# Patient Record
Sex: MALE | Race: WHITE | Employment: UNEMPLOYED | URBAN - METROPOLITAN AREA
[De-identification: names, ages, dates, MRNs, and addresses within clinical notes are randomized per-mention and may not be internally consistent; named-entity substitution may affect disease eponyms.]

---

## 2020-06-02 ENCOUNTER — HOSPITAL ENCOUNTER (EMERGENCY)
Age: 25
Discharge: HOME | End: 2020-06-02
Attending: EMERGENCY MEDICINE
Payer: COMMERCIAL

## 2020-06-02 ENCOUNTER — APPOINTMENT (OUTPATIENT)
Dept: GENERAL RADIOLOGY | Age: 25
End: 2020-06-02
Payer: COMMERCIAL

## 2020-06-02 VITALS
OXYGEN SATURATION: 97 % | WEIGHT: 152.2 LBS | HEART RATE: 82 BPM | BODY MASS INDEX: 23.07 KG/M2 | SYSTOLIC BLOOD PRESSURE: 140 MMHG | HEIGHT: 68 IN | RESPIRATION RATE: 16 BRPM | TEMPERATURE: 97.9 F | DIASTOLIC BLOOD PRESSURE: 74 MMHG

## 2020-06-02 PROCEDURE — 11740 EVACUATION SUBUNGUAL HMTMA: CPT

## 2020-06-02 PROCEDURE — 73140 X-RAY EXAM OF FINGER(S): CPT

## 2020-06-02 PROCEDURE — 99283 EMERGENCY DEPT VISIT LOW MDM: CPT

## 2020-06-02 RX ORDER — FLUOXETINE HYDROCHLORIDE 20 MG/1
20 CAPSULE ORAL DAILY
COMMUNITY

## 2020-06-02 RX ORDER — CLONAZEPAM 0.5 MG/1
0.5 TABLET ORAL 2 TIMES DAILY PRN
COMMUNITY

## 2020-06-02 SDOH — HEALTH STABILITY: MENTAL HEALTH: HOW OFTEN DO YOU HAVE A DRINK CONTAINING ALCOHOL?: NEVER

## 2020-06-02 ASSESSMENT — PAIN DESCRIPTION - FREQUENCY: FREQUENCY: CONTINUOUS

## 2020-06-02 ASSESSMENT — PAIN DESCRIPTION - LOCATION: LOCATION: FINGER (COMMENT WHICH ONE)

## 2020-06-02 ASSESSMENT — PAIN DESCRIPTION - ORIENTATION: ORIENTATION: LEFT

## 2020-06-02 ASSESSMENT — PAIN SCALES - GENERAL: PAINLEVEL_OUTOF10: 8

## 2020-06-02 ASSESSMENT — PAIN DESCRIPTION - DESCRIPTORS: DESCRIPTORS: ACHING;THROBBING

## 2020-06-02 ASSESSMENT — PAIN DESCRIPTION - PAIN TYPE: TYPE: ACUTE PAIN

## 2020-06-03 NOTE — ED PROVIDER NOTES
Emergency Physician Note    Chief Complaint  Injury (left index finger shut door on it today)       History of Present Illness  Gilmar Pineda is a 22 y.o. male who presents to the ED for finger injury. Patient reports earlier today he accidentally shot his left finger in a car door. The pain increased during the day so he came for evaluation. He denies any other injuries or pain complaints. 10 systems reviewed, pertinent positives per HPI otherwise noted to be negative    I have reviewed the following from the nursing documentation:      Prior to Admission medications    Medication Sig Start Date End Date Taking? Authorizing Provider   FLUoxetine (PROZAC) 20 MG capsule Take 20 mg by mouth daily   Yes Historical Provider, MD   clonazePAM (KLONOPIN) 0.5 MG tablet Take 0.5 mg by mouth 2 times daily as needed. Yes Historical Provider, MD       Allergies as of 06/02/2020    (No Known Allergies)       History reviewed. No pertinent past medical history. Surgical History: History reviewed. No pertinent surgical history. Family History:  History reviewed. No pertinent family history.     Social History     Socioeconomic History    Marital status: Single     Spouse name: Not on file    Number of children: Not on file    Years of education: Not on file    Highest education level: Not on file   Occupational History    Not on file   Social Needs    Financial resource strain: Not on file    Food insecurity     Worry: Not on file     Inability: Not on file    Transportation needs     Medical: Not on file     Non-medical: Not on file   Tobacco Use    Smoking status: Never Smoker    Smokeless tobacco: Never Used   Substance and Sexual Activity    Alcohol use: Never     Frequency: Never    Drug use: Not on file    Sexual activity: Not on file   Lifestyle    Physical activity     Days per week: Not on file     Minutes per session: Not on file    Stress: Not on file   Relationships    Social connections     Talks on phone: Not on file     Gets together: Not on file     Attends Christian service: Not on file     Active member of club or organization: Not on file     Attends meetings of clubs or organizations: Not on file     Relationship status: Not on file    Intimate partner violence     Fear of current or ex partner: Not on file     Emotionally abused: Not on file     Physically abused: Not on file     Forced sexual activity: Not on file   Other Topics Concern    Not on file   Social History Narrative    Not on file       Nursing notes reviewed. ED Triage Vitals [06/02/20 2206]   Enc Vitals Group      BP (!) 140/74      Pulse 82      Resp 16      Temp 97.9 °F (36.6 °C)      Temp Source Oral      SpO2 97 %      Weight 152 lb 3.2 oz (69 kg)      Height 5' 8\" (1.727 m)      Head Circumference       Peak Flow       Pain Score       Pain Loc       Pain Edu? Excl. in [address]? GENERAL:  Awake, alert. Well developed, well nourished with no apparent distress. HENT:  Normocephalic, Atraumatic, moist mucous membranes. EXTREMITIES:  Normal range of motion, left index finger is edematous and tender over the distal phalanx and there is a subungual hematoma as well, no deformity, distal pulses present. SKIN: Warm, dry and intact. NEUROLOGIC: Normal mental status. Moving all extremities to command. RADIOLOGY  X-RAYS:  I have reviewed radiologic plain film image(s). ALL OTHER NON-PLAIN FILM IMAGES SUCH AS CT, ULTRASOUND AND MRI HAVE BEEN READ BY THE RADIOLOGIST. XR FINGER LEFT (MIN 2 VIEWS)   Final Result   1. No acute osseous abnormality. PROCEDURES  The left index subungual hematoma was cleaned with alcohol swab and then drained by spinning and 18-gauge needle over the center of the hematoma. Patient tolerated procedure well with minimal pain.     MEDICAL DECISION MAKING       I advised the patient to return to the emergency department immediately for any new or worsening symptoms, such as redness, swelling or increased pain. The patient voiced agreement and understanding of the treatment plan. No results found for this visit on 06/02/20. I estimate there is LOW risk for CELLULITIS, COMPARTMENT SYNDROME, NECROTIZING FASCIITIS, TENDON OR NEUROVASCULAR INJURY, or FOREIGN BODY, thus I consider the discharge disposition reasonable. Also, there is no evidence or peritonitis, sepsis, or toxicity. Brea Rosario and I have discussed the diagnosis and risks, and we agree with discharging home to follow-up with their primary doctor. We also discussed returning to the Emergency Department immediately if new or worsening symptoms occur. We have discussed the symptoms which are most concerning (e.g., changing or worsening pain, fever, numbness, weakness, cool or painful digits) that necessitate immediate return. Final Impression    1. Subungual hematoma of digit of hand, initial encounter        Discharge Vital Signs:  Blood pressure (!) 140/74, pulse 82, temperature 97.9 °F (36.6 °C), temperature source Oral, resp. rate 16, height 5' 8\" (1.727 m), weight 69 kg (152 lb 3.2 oz), SpO2 97 %. Patient was given scripts for the following medications. I counseled patient how to take these medications. New Prescriptions    No medications on file       Disposition  Pt is in good condition upon Discharge to home. This chart was generated using the [employer] dictation system. I created this record but it may contain dictation errors.           Milagros Rodríguez MD  06/02/20 6895

## 2020-06-03 NOTE — ED NOTES
Pt to ed with c/o left index finger injury tonight, slammed it in a car door earlier today.  Exam per MD.      Shell Mendoza RN  06/02/20 5934